# Patient Record
Sex: FEMALE | Race: WHITE | URBAN - METROPOLITAN AREA
[De-identification: names, ages, dates, MRNs, and addresses within clinical notes are randomized per-mention and may not be internally consistent; named-entity substitution may affect disease eponyms.]

---

## 2020-10-22 ENCOUNTER — OFFICE VISIT (OUTPATIENT)
Dept: URBAN - METROPOLITAN AREA CLINIC 41 | Facility: CLINIC | Age: 65
End: 2020-10-22
Payer: MEDICARE

## 2020-10-22 DIAGNOSIS — H43.813 VITREOUS DEGENERATION, BILATERAL: ICD-10-CM

## 2020-10-22 DIAGNOSIS — H04.123 DRY EYE SYNDROME OF BILATERAL LACRIMAL GLANDS: ICD-10-CM

## 2020-10-22 PROCEDURE — 99214 OFFICE O/P EST MOD 30 MIN: CPT | Performed by: OPHTHALMOLOGY

## 2020-10-22 PROCEDURE — 67028 INJECTION EYE DRUG: CPT | Performed by: OPHTHALMOLOGY

## 2020-10-22 ASSESSMENT — INTRAOCULAR PRESSURE
OD: 18
OS: 17

## 2020-10-22 NOTE — IMPRESSION/PLAN
Impression: Retinal edema, OD. Status: Symptomatic.
s/p Avastin x 36 last 05/19/2020 (consented 01/07/2020) Plan: Exam and OCT reveal IRF with cystic changes OD (215 microns, from 472 microns). An IVFA from 05/19/2020 demonstrated leakage. Fundus Photos from 05/19/2020 demonstrated pigmentation. Observe. Thanks, Vin Breeding Return in 4 months, for re-eval CME OD (add gel), IVFA OD 1st

## 2021-02-09 ENCOUNTER — OFFICE VISIT (OUTPATIENT)
Dept: URBAN - METROPOLITAN AREA CLINIC 68 | Facility: CLINIC | Age: 66
End: 2021-02-09
Payer: MEDICARE

## 2021-02-09 DIAGNOSIS — Z96.1 PRESENCE OF INTRAOCULAR LENS: ICD-10-CM

## 2021-02-09 PROCEDURE — 92134 CPTRZ OPH DX IMG PST SGM RTA: CPT | Performed by: OPHTHALMOLOGY

## 2021-02-09 PROCEDURE — 92014 COMPRE OPH EXAM EST PT 1/>: CPT | Performed by: OPHTHALMOLOGY

## 2021-02-09 ASSESSMENT — INTRAOCULAR PRESSURE
OD: 16
OS: 14

## 2021-02-09 NOTE — IMPRESSION/PLAN
Impression: Retinal edema, OD. Status: Symptomatic.
s/p Avastin x 36 last 05/19/2020 (consented 01/07/2020) Plan: Exam and OCT reveal IRF with cystic changes OD (208 microns, from 472 microns). An IVFA from 05/19/2020 demonstrated leakage. Fundus Photos from 05/19/2020 demonstrated pigmentation. Observe. Thanks, Marissa Friass Return in 4 months, for re-eval CME OD (add gel), IVFA OD 1st

## 2021-06-03 ENCOUNTER — OFFICE VISIT (OUTPATIENT)
Dept: URBAN - METROPOLITAN AREA CLINIC 41 | Facility: CLINIC | Age: 66
End: 2021-06-03
Payer: MEDICARE

## 2021-06-03 DIAGNOSIS — B39.9 HISTOPLASMOSIS, UNSPECIFIED: ICD-10-CM

## 2021-06-03 DIAGNOSIS — H35.81 RETINAL EDEMA: Primary | ICD-10-CM

## 2021-06-03 PROCEDURE — 92235 FLUORESCEIN ANGRPH MLTIFRAME: CPT | Performed by: OPHTHALMOLOGY

## 2021-06-03 PROCEDURE — 92134 CPTRZ OPH DX IMG PST SGM RTA: CPT | Performed by: OPHTHALMOLOGY

## 2021-06-03 PROCEDURE — 99214 OFFICE O/P EST MOD 30 MIN: CPT | Performed by: OPHTHALMOLOGY

## 2021-06-03 ASSESSMENT — INTRAOCULAR PRESSURE
OD: 16
OS: 17

## 2021-06-03 NOTE — IMPRESSION/PLAN
Impression: Retinal edema, OD. Status: Symptomatic.
s/p Avastin x 36 last 05/19/2020 Plan: Exam and OCT reveal IRF with cystic changes OD (215 microns, from 472 microns). An IVFA from 06/03/2021 demonstrated leakage. Fundus Photos from 06/03/2021 demonstrated pigmentation. Observe. Thanks, Keily Conti Return in 6 months, for re-eval CME OD (add gel), IVFA OD 1st

## 2021-12-02 ENCOUNTER — OFFICE VISIT (OUTPATIENT)
Dept: URBAN - METROPOLITAN AREA CLINIC 41 | Facility: CLINIC | Age: 66
End: 2021-12-02
Payer: MEDICARE

## 2021-12-02 PROCEDURE — 99214 OFFICE O/P EST MOD 30 MIN: CPT | Performed by: OPHTHALMOLOGY

## 2021-12-02 PROCEDURE — 92134 CPTRZ OPH DX IMG PST SGM RTA: CPT | Performed by: OPHTHALMOLOGY

## 2021-12-02 PROCEDURE — 92235 FLUORESCEIN ANGRPH MLTIFRAME: CPT | Performed by: OPHTHALMOLOGY

## 2021-12-02 ASSESSMENT — INTRAOCULAR PRESSURE
OD: 17
OS: 18

## 2021-12-02 NOTE — IMPRESSION/PLAN
Impression: Retinal edema, OD. Status: Symptomatic.
s/p Avastin x 36 last 05/19/2020 Plan: Exam and OCT reveal IRF with cystic changes OD (218 microns, from 472 microns). An IVFA from 12/2/2021 demonstrated leakage. Fundus Photos from 12/2/2021 demonstrated pigmentation. Observe. Thanks, Edmar Betancourt Return in 6 months, for re-eval CME OD (add gel), IVFA OD 1st

## 2022-06-30 ENCOUNTER — OFFICE VISIT (OUTPATIENT)
Dept: URBAN - METROPOLITAN AREA CLINIC 41 | Facility: CLINIC | Age: 67
End: 2022-06-30
Payer: MEDICARE

## 2022-06-30 DIAGNOSIS — H04.123 DRY EYE SYNDROME OF BILATERAL LACRIMAL GLANDS: ICD-10-CM

## 2022-06-30 DIAGNOSIS — H35.81 RETINAL EDEMA: Primary | ICD-10-CM

## 2022-06-30 DIAGNOSIS — Z96.1 PRESENCE OF INTRAOCULAR LENS: ICD-10-CM

## 2022-06-30 DIAGNOSIS — B39.9 HISTOPLASMOSIS, UNSPECIFIED: ICD-10-CM

## 2022-06-30 DIAGNOSIS — H43.813 VITREOUS DEGENERATION, BILATERAL: ICD-10-CM

## 2022-06-30 PROCEDURE — 99214 OFFICE O/P EST MOD 30 MIN: CPT | Performed by: OPHTHALMOLOGY

## 2022-06-30 PROCEDURE — 92134 CPTRZ OPH DX IMG PST SGM RTA: CPT | Performed by: OPHTHALMOLOGY

## 2022-06-30 ASSESSMENT — INTRAOCULAR PRESSURE
OS: 17
OD: 20

## 2022-06-30 NOTE — IMPRESSION/PLAN
Impression: Retinal edema, OD. Status: Symptomatic.
s/p Avastin x 36 last 05/19/2020 Plan: Exam and OCT reveal IRF with cystic changes OD (223 microns, from 472 microns). An IVFA from 12/2/2021 demonstrated leakage. Fundus Photos from 12/2/2021 demonstrated pigmentation. Observe. Thanks, Dorina Bamberger Return in 6 months, for re-eval CME OD (add gel), IVFA OD 1st

## 2023-01-12 ENCOUNTER — OFFICE VISIT (OUTPATIENT)
Dept: URBAN - METROPOLITAN AREA CLINIC 41 | Facility: CLINIC | Age: 68
End: 2023-01-12
Payer: MEDICARE

## 2023-01-12 DIAGNOSIS — Z96.1 PRESENCE OF INTRAOCULAR LENS: ICD-10-CM

## 2023-01-12 DIAGNOSIS — H35.81 RETINAL EDEMA: Primary | ICD-10-CM

## 2023-01-12 DIAGNOSIS — H43.813 VITREOUS DEGENERATION, BILATERAL: ICD-10-CM

## 2023-01-12 DIAGNOSIS — H04.123 DRY EYE SYNDROME OF BILATERAL LACRIMAL GLANDS: ICD-10-CM

## 2023-01-12 DIAGNOSIS — B39.9 HISTOPLASMOSIS, UNSPECIFIED: ICD-10-CM

## 2023-01-12 PROCEDURE — 92235 FLUORESCEIN ANGRPH MLTIFRAME: CPT | Performed by: OPHTHALMOLOGY

## 2023-01-12 PROCEDURE — 99214 OFFICE O/P EST MOD 30 MIN: CPT | Performed by: OPHTHALMOLOGY

## 2023-01-12 PROCEDURE — 92134 CPTRZ OPH DX IMG PST SGM RTA: CPT | Performed by: OPHTHALMOLOGY

## 2023-01-12 ASSESSMENT — INTRAOCULAR PRESSURE
OS: 18
OD: 20

## 2023-01-12 NOTE — IMPRESSION/PLAN
Impression: Retinal edema, OD. Status: Symptomatic.
s/p Avastin x 37 last 05/19/2020 Plan: Exam and OCT reveal IRF with cystic changes OD (217 microns, from 472 microns). An IVFA from 1/12/2023 demonstrated leakage. Fundus Photos from 1/12/2023 demonstrated pigmentation. Observe. Thanks, Cynthia Jernigan Return in 3 months, for re-eval CME OD (add gel), IVFA OD 1st, and in 1 year if stable

## 2023-05-04 ENCOUNTER — OFFICE VISIT (OUTPATIENT)
Dept: URBAN - METROPOLITAN AREA CLINIC 41 | Facility: CLINIC | Age: 68
End: 2023-05-04
Payer: MEDICARE

## 2023-05-04 DIAGNOSIS — H04.123 DRY EYE SYNDROME OF BILATERAL LACRIMAL GLANDS: ICD-10-CM

## 2023-05-04 DIAGNOSIS — Z96.1 PRESENCE OF INTRAOCULAR LENS: ICD-10-CM

## 2023-05-04 DIAGNOSIS — H35.81 RETINAL EDEMA: Primary | ICD-10-CM

## 2023-05-04 DIAGNOSIS — H43.813 VITREOUS DEGENERATION, BILATERAL: ICD-10-CM

## 2023-05-04 DIAGNOSIS — B39.9 HISTOPLASMOSIS, UNSPECIFIED: ICD-10-CM

## 2023-05-04 PROCEDURE — 92134 CPTRZ OPH DX IMG PST SGM RTA: CPT | Performed by: OPHTHALMOLOGY

## 2023-05-04 PROCEDURE — 99214 OFFICE O/P EST MOD 30 MIN: CPT | Performed by: OPHTHALMOLOGY

## 2023-05-04 ASSESSMENT — INTRAOCULAR PRESSURE
OS: 17
OD: 19

## 2023-05-04 NOTE — IMPRESSION/PLAN
Impression: Retinal edema, OD. Status: Symptomatic.
s/p Avastin x 37 last 05/19/2020 Plan: Exam and OCT reveal IRF with cystic changes OD (223 microns, from 472 microns). An IVFA from 1/12/2023 demonstrated leakage. Fundus Photos from 1/12/2023 demonstrated pigmentation. Observe. Carotenoids. Thanks, Fernie Pablo Return in 18 months, for re-eval CME OD (add gel), IVFA OD 1st